# Patient Record
Sex: MALE | Race: WHITE
[De-identification: names, ages, dates, MRNs, and addresses within clinical notes are randomized per-mention and may not be internally consistent; named-entity substitution may affect disease eponyms.]

---

## 2017-07-11 ENCOUNTER — HOSPITAL ENCOUNTER (OUTPATIENT)
Dept: HOSPITAL 62 - RT | Age: 27
End: 2017-07-11
Attending: INTERNAL MEDICINE
Payer: OTHER GOVERNMENT

## 2017-07-11 DIAGNOSIS — R07.9: Primary | ICD-10-CM

## 2017-07-11 DIAGNOSIS — R06.02: ICD-10-CM

## 2017-07-11 DIAGNOSIS — C81.18: ICD-10-CM

## 2017-07-11 LAB
BASE EXCESS BLDA CALC-SCNC: 2.4 MMOL/L
SAO2 % BLDA: 97.6 % (ref 94–98)

## 2017-07-11 PROCEDURE — 82803 BLOOD GASES ANY COMBINATION: CPT

## 2017-07-11 PROCEDURE — 94060 EVALUATION OF WHEEZING: CPT

## 2017-07-11 PROCEDURE — 94729 DIFFUSING CAPACITY: CPT

## 2017-07-11 PROCEDURE — 94760 N-INVAS EAR/PLS OXIMETRY 1: CPT

## 2017-07-11 PROCEDURE — 94727 GAS DIL/WSHOT DETER LNG VOL: CPT

## 2017-07-11 PROCEDURE — 36600 WITHDRAWAL OF ARTERIAL BLOOD: CPT

## 2017-07-13 NOTE — PULMONARY FUNCTION TEST
DATE OF SERVICE:  07/11/2017



THE VITAL CAPACITY IS  SLIGHTLY DECREASED.



THE EXPIRATORY FLOW RATES ARE  SLIGHTLY DECREASED.



THE FEV1/VC IS  81%, PREDICTED:  85%

                              



LUNG VOLUMES BY NITROGEN WASH OUT METHOD SHOW:



   TLC IS 93% OF PREDICTED



   FRC % OF PREDICTED



   RV % OF PREDICTED



THE DLCO IS 25.1, 92% OF PREDICTED.



THE RV/TLC RATIO IS 32%   PREDICTED  27%



AFTER BRONCHODILATOR, EXPIRATORY FLOW RATES SHOW NO SIGNIFICANT CHANGE.







IMPRESSION:  THE INSPIRATORY LIMB OF THE F-V LOOP WAS POORLY PERFORMED.  THE 
EXPIRATORY SPIROGRAM WAS ADEQUATE AND SHOWS A SLIGHT OBSTRUCTIVE DEFECT.  LUNG 
VOLUMES AND DIFFUSING CAPACITY ARE NORMAL.





CC:  ELEN FRASER MD

>
SARAH

## 2017-07-17 ENCOUNTER — HOSPITAL ENCOUNTER (OUTPATIENT)
Dept: HOSPITAL 62 - RAD | Age: 27
End: 2017-07-17
Attending: INTERNAL MEDICINE
Payer: OTHER GOVERNMENT

## 2017-07-17 DIAGNOSIS — Z08: ICD-10-CM

## 2017-07-17 DIAGNOSIS — Z13.6: Primary | ICD-10-CM

## 2017-07-17 PROCEDURE — 78472 GATED HEART PLANAR SINGLE: CPT

## 2017-07-17 PROCEDURE — A9560 TC99M LABELED RBC: HCPCS

## 2017-07-17 NOTE — RADIOLOGY REPORT (SQ)
EXAM DESCRIPTION:  NM MUGA REST



COMPLETED DATE/TIME:  7/17/2017 2:35 pm



REASON FOR STUDY:  ENCOUNTER FOR SCREENING FOR CARDIOVASCULAR DISORDERS (Z13.6), F/U CHEMO (Z0 Z13.6 
 ENCOUNTER FOR SCREENING FOR CARDIOVASCULAR DISORDERS Z08  ENCNTR FOR FOLLOW-UP EXAM AFTER TRTMT FOR 
MALIGNANT NEOP



COMPARISON:  None.



RADIONUCLIDE AND DOSE:  21.9 mCi technetium 99 M pyrophosphate

The route of agent administration: Intravenous



TECHNIQUE:  Following administration of the radionuclide, gated images of the heart are obtained in t
hree projections.  Left ventricular functional analysis performed.



LIMITATIONS:  None.



FINDINGS:   LEFT VENTRICULAR FUNCTION:

EJECTION FRACTION: 66%.

END-DIASTOLIC VOLUME: 125 mL.

END-SYSTOLIC VOLUME: 40 mL.

WALL MOTION: No focal wall motion abnormalities.

OTHER: No other significant finding.



IMPRESSION:  NORMAL CARDIAC MUGA STUDY.  NORMAL LEFT VENTRICULAR FUNCTION WITH left ventricular eject
ion fraction estimated at 66%.



TECHNICAL DOCUMENTATION:  JOB ID:  [id number]

 2011 [employer]- All Rights Reserved

## 2018-02-27 ENCOUNTER — HOSPITAL ENCOUNTER (OUTPATIENT)
Dept: HOSPITAL 62 - RAD | Age: 28
End: 2018-02-27
Attending: INTERNAL MEDICINE
Payer: OTHER GOVERNMENT

## 2018-02-27 DIAGNOSIS — C81.18: Primary | ICD-10-CM

## 2018-02-27 PROCEDURE — 78815 PET IMAGE W/CT SKULL-THIGH: CPT

## 2018-02-27 PROCEDURE — A9552 F18 FDG: HCPCS

## 2018-02-28 NOTE — RADIOLOGY REPORT (SQ)
EXAM DESCRIPTION:  PET CT SKULL/THIGH



COMPLETED DATE/TIME:  2/27/2018 8:25 pm



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



COMPARISON:  CT neck chest abdomen and pelvis 10/10/2017 [facility]



RADIONUCLIDE AND DOSE:  10.8 mCi F18 FDG

The route of agent administration: Intravenous



FASTING BLOOD SUGAR:  93 mg/dl



CONTRAST TYPE AND DOSE:  No CT contrast given.



TECHNIQUE:  Blood glucose level was verified.  Above dose of FDG was injected intravenously.  2-D seg
mented attenuation correction images were obtained from the base of the skull to the midthighs.  Nonc
ontrast CT images were obtained for attenuation correction and fusion with emission images.  CT image
s were performed without oral or intravenous contrast and are not sensitive for parenchymal lesions. 
 A series of overlapping emission PET images were obtained.  Images reviewed and manipulated at [facility] work station by the radiologist.  Images stored on PACS.



LIMITATIONS:  None.



FINDINGS:  HEAD AND NECK: No areas of abnormal metabolic activity in the soft tissues of the head and
 neck.  Specifically, no right or left supraclavicular lymph nodes are present

CHEST: No areas of abnormal metabolic activity in the chest.  Specifically, no mediastinal or thymic 
activity is present.  There is residual anterior mediastinal thymus tissue with activity at baseline,
 1.2 SUV.  This rind of tissue in anterior mediastinum measures 3 x 1.4 cm in size (was 3.3 x 1.4 cm 
in size on 10/10/2017).

ABDOMEN AND PELVIS: No areas of abnormal metabolic activity in the abdomen or pelvis.  Expected physi
ologic activity is present in the genitourinary system and bowel.

PROXIMAL LOWER EXTREMITIES: No areas of abnormal metabolic activity in the soft tissues of the lower 
extremities.

BONES: No abnormal metabolic activity in the visualized skeleton.

ADDITIONAL CT FINDINGS: No additional significant findings on the noncontrast CT images.

OTHER: Liver background activity 1.5 SUV.  Blood pool background activity 1.2 SUV.



IMPRESSION:  No metabolically active adenopathy or mediastinal soft tissue worrisome for recurrent Ho
dgkin's lymphoma



TECHNICAL DOCUMENTATION:  JOB ID:  [id number]

 2011 [employer]- All Rights Reserved



Reading location - IP/workstation name: [facility]